# Patient Record
Sex: MALE | Race: WHITE
[De-identification: names, ages, dates, MRNs, and addresses within clinical notes are randomized per-mention and may not be internally consistent; named-entity substitution may affect disease eponyms.]

---

## 2018-10-10 ENCOUNTER — HOSPITAL ENCOUNTER (EMERGENCY)
Dept: HOSPITAL 80 - FED | Age: 73
Discharge: HOME | End: 2018-10-10
Payer: COMMERCIAL

## 2018-10-10 VITALS — DIASTOLIC BLOOD PRESSURE: 70 MMHG | SYSTOLIC BLOOD PRESSURE: 122 MMHG
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
## 2018-10-10 DIAGNOSIS — S61.032A: Primary | ICD-10-CM
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
## 2018-10-10 NOTE — EDPHY
H & P


Time Seen by Provider: 10/10/18 11:07


HPI/ROS: 





CHIEF COMPLAINT:  Staple left thumb





HISTORY OF PRESENT ILLNESS:  73-year-old right-hand-dominant male with up-to-

date tetanus was picking up a staple accidentally had a staple impact his left 

thumb distal phalanx.  Occurred shortly prior to arrival.  Accidental.








************


PHYSICAL EXAM





(Prior to examination, patient consented to physical exam, hands were washed 

and my usual and customary physical exam procedures followed)


1) GENERAL: Well-developed, well-nourished, alert and oriented.  Appears to be 

in no acute distress.


2) HEAD: Normocephalic


3) HEENT: sclera anicteric   


4) LUNGS: Breathing comfortably.   


5) SKIN:  Left thumb distal phalanx a staple is embedded, parallel to his skin, 

through and through.  Superficial.   


6) MUSCULOSKELETAL: No signs of infection.  Negative kanavel.    


7) NEUROLOGIC:  Full sensation distally.  Two-point discrimination intact.











Smoking Status: Never smoked


Constitutional: 


 Initial Vital Signs











Temperature (C)  36.9 C   10/10/18 10:55


 


Heart Rate  69   10/10/18 10:55


 


Respiratory Rate  18   10/10/18 10:55


 


Blood Pressure  122/70 H  10/10/18 10:55


 


O2 Sat (%)  98   10/10/18 10:55








 











O2 Delivery Mode               Room Air














Allergies/Adverse Reactions: 


 





Penicillins Allergy (Verified 10/10/18 11:01)


 








Home Medications: 














 Medication  Instructions  Recorded


 


Cephalexin [Keflex] 500 mg PO TID 10 Days  cap 10/10/18


 


Flomax  10/10/18


 


Propranolol HCl  10/10/18


 


Tricor  10/10/18














MDM/Departure





- MDM


Procedures: 





Procedure:  Foreign body removal


Indication:  Staple in the left distal phalanx thumb


Area was prepped draped, using hemostats was able to easily remove the staple.  

Areas and copiously irrigated.  Patient was offered pre procedure digital nerve 

block which he declined.  Patient tolerated procedure well





- Depart


Disposition: Home, Routine, Self-Care


Clinical Impression: 


Foreign body of left thumb


Qualifiers:


 Encounter type: initial encounter Qualified Code(s): S60.352A - Superficial 

foreign body of left thumb, initial encounter





Condition: Good


Instructions:  Soft Tissue Foreign Body (ED)


Additional Instructions: 


Return to the ER if you develop redness, swelling, discharge, warmth to the 

wound, red streaks going up your arm, or any other symptoms that concern you.


Prescriptions: 


Cephalexin [Keflex] 500 mg PO TID 10 Days  cap


Referrals: 


America Painting MD [Primary Care Provider] - 2-3 days, call for appt.